# Patient Record
Sex: FEMALE | Race: WHITE | NOT HISPANIC OR LATINO | URBAN - METROPOLITAN AREA
[De-identification: names, ages, dates, MRNs, and addresses within clinical notes are randomized per-mention and may not be internally consistent; named-entity substitution may affect disease eponyms.]

---

## 2019-11-05 ENCOUNTER — EMERGENCY (EMERGENCY)
Facility: HOSPITAL | Age: 24
LOS: 1 days | Discharge: HOME | End: 2019-11-05
Attending: STUDENT IN AN ORGANIZED HEALTH CARE EDUCATION/TRAINING PROGRAM | Admitting: STUDENT IN AN ORGANIZED HEALTH CARE EDUCATION/TRAINING PROGRAM
Payer: MEDICAID

## 2019-11-05 VITALS
DIASTOLIC BLOOD PRESSURE: 102 MMHG | SYSTOLIC BLOOD PRESSURE: 144 MMHG | HEART RATE: 118 BPM | OXYGEN SATURATION: 100 % | TEMPERATURE: 98 F | RESPIRATION RATE: 16 BRPM

## 2019-11-05 VITALS
TEMPERATURE: 98 F | SYSTOLIC BLOOD PRESSURE: 132 MMHG | DIASTOLIC BLOOD PRESSURE: 81 MMHG | HEART RATE: 72 BPM | OXYGEN SATURATION: 100 % | RESPIRATION RATE: 18 BRPM

## 2019-11-05 LAB
ALBUMIN SERPL ELPH-MCNC: 4.7 G/DL — SIGNIFICANT CHANGE UP (ref 3.3–5)
ALP SERPL-CCNC: 59 U/L — SIGNIFICANT CHANGE UP (ref 40–120)
ALT FLD-CCNC: 12 U/L — SIGNIFICANT CHANGE UP (ref 4–33)
ANION GAP SERPL CALC-SCNC: 14 MMO/L — SIGNIFICANT CHANGE UP (ref 7–14)
APPEARANCE UR: CLEAR — SIGNIFICANT CHANGE UP
AST SERPL-CCNC: 17 U/L — SIGNIFICANT CHANGE UP (ref 4–32)
BILIRUB SERPL-MCNC: 0.4 MG/DL — SIGNIFICANT CHANGE UP (ref 0.2–1.2)
BILIRUB UR-MCNC: NEGATIVE — SIGNIFICANT CHANGE UP
BLOOD UR QL VISUAL: NEGATIVE — SIGNIFICANT CHANGE UP
BUN SERPL-MCNC: 13 MG/DL — SIGNIFICANT CHANGE UP (ref 7–23)
CALCIUM SERPL-MCNC: 10.1 MG/DL — SIGNIFICANT CHANGE UP (ref 8.4–10.5)
CHLORIDE SERPL-SCNC: 101 MMOL/L — SIGNIFICANT CHANGE UP (ref 98–107)
CO2 SERPL-SCNC: 23 MMOL/L — SIGNIFICANT CHANGE UP (ref 22–31)
COLOR SPEC: COLORLESS — SIGNIFICANT CHANGE UP
CREAT SERPL-MCNC: 0.98 MG/DL — SIGNIFICANT CHANGE UP (ref 0.5–1.3)
D DIMER BLD IA.RAPID-MCNC: < 150 NG/ML — SIGNIFICANT CHANGE UP
GLUCOSE SERPL-MCNC: 100 MG/DL — HIGH (ref 70–99)
GLUCOSE UR-MCNC: NEGATIVE — SIGNIFICANT CHANGE UP
HCT VFR BLD CALC: 42.1 % — SIGNIFICANT CHANGE UP (ref 34.5–45)
HGB BLD-MCNC: 14.1 G/DL — SIGNIFICANT CHANGE UP (ref 11.5–15.5)
INR BLD: 1 — SIGNIFICANT CHANGE UP (ref 0.88–1.17)
KETONES UR-MCNC: NEGATIVE — SIGNIFICANT CHANGE UP
LEUKOCYTE ESTERASE UR-ACNC: NEGATIVE — SIGNIFICANT CHANGE UP
MCHC RBC-ENTMCNC: 30.5 PG — SIGNIFICANT CHANGE UP (ref 27–34)
MCHC RBC-ENTMCNC: 33.5 % — SIGNIFICANT CHANGE UP (ref 32–36)
MCV RBC AUTO: 91.1 FL — SIGNIFICANT CHANGE UP (ref 80–100)
NITRITE UR-MCNC: NEGATIVE — SIGNIFICANT CHANGE UP
NRBC # FLD: 0 K/UL — SIGNIFICANT CHANGE UP (ref 0–0)
PH UR: 6.5 — SIGNIFICANT CHANGE UP (ref 5–8)
PLATELET # BLD AUTO: 218 K/UL — SIGNIFICANT CHANGE UP (ref 150–400)
PMV BLD: 10.3 FL — SIGNIFICANT CHANGE UP (ref 7–13)
POTASSIUM SERPL-MCNC: 4 MMOL/L — SIGNIFICANT CHANGE UP (ref 3.5–5.3)
POTASSIUM SERPL-SCNC: 4 MMOL/L — SIGNIFICANT CHANGE UP (ref 3.5–5.3)
PROT SERPL-MCNC: 8 G/DL — SIGNIFICANT CHANGE UP (ref 6–8.3)
PROT UR-MCNC: NEGATIVE — SIGNIFICANT CHANGE UP
PROTHROM AB SERPL-ACNC: 11.4 SEC — SIGNIFICANT CHANGE UP (ref 9.8–13.1)
RBC # BLD: 4.62 M/UL — SIGNIFICANT CHANGE UP (ref 3.8–5.2)
RBC # FLD: 11.9 % — SIGNIFICANT CHANGE UP (ref 10.3–14.5)
SODIUM SERPL-SCNC: 138 MMOL/L — SIGNIFICANT CHANGE UP (ref 135–145)
SP GR SPEC: 1 — SIGNIFICANT CHANGE UP (ref 1–1.04)
TSH SERPL-MCNC: 2.24 UIU/ML — SIGNIFICANT CHANGE UP (ref 0.27–4.2)
UROBILINOGEN FLD QL: NORMAL — SIGNIFICANT CHANGE UP
WBC # BLD: 6.89 K/UL — SIGNIFICANT CHANGE UP (ref 3.8–10.5)
WBC # FLD AUTO: 6.89 K/UL — SIGNIFICANT CHANGE UP (ref 3.8–10.5)

## 2019-11-05 PROCEDURE — 99284 EMERGENCY DEPT VISIT MOD MDM: CPT | Mod: 25

## 2019-11-05 PROCEDURE — 71046 X-RAY EXAM CHEST 2 VIEWS: CPT | Mod: 26

## 2019-11-05 PROCEDURE — 93010 ELECTROCARDIOGRAM REPORT: CPT

## 2019-11-05 RX ORDER — SODIUM CHLORIDE 9 MG/ML
1000 INJECTION INTRAMUSCULAR; INTRAVENOUS; SUBCUTANEOUS ONCE
Refills: 0 | Status: COMPLETED | OUTPATIENT
Start: 2019-11-05 | End: 2019-11-05

## 2019-11-05 RX ADMIN — SODIUM CHLORIDE 1000 MILLILITER(S): 9 INJECTION INTRAMUSCULAR; INTRAVENOUS; SUBCUTANEOUS at 13:51

## 2019-11-05 NOTE — ED PROVIDER NOTE - NSFOLLOWUPINSTRUCTIONS_ED_ALL_ED_FT
PLEASE MAKE SURE THAT YOU DRINK PLENTY OF FLUIDS, SCHEDULE AN APPOINTMENT TO SEE OPHTHALMOLOGIST ([phone]), ALSO SEE CARDIOLOGIST IF THE SYMPTOMS PERSISTS; Palpitations  Palpitations are feelings that your heartbeat is irregular or is faster than normal. It may feel like your heart is fluttering or skipping a beat. Palpitations are usually not a serious problem. They may be caused by many things, including smoking, caffeine, alcohol, stress, and certain medicines or drugs. Most causes of palpitations are not serious. However, some palpitations can be a sign of a serious problem. You may need further tests to rule out serious medical problems.  Follow these instructions at home:  Image       Image   Pay attention to any changes in your condition. Take these actions to help manage your symptoms:  Eating and drinking     Avoid foods and drinks that may cause palpitations. These may include:  Caffeinated coffee, tea, soft drinks, diet pills, and energy drinks.Chocolate.Alcohol.Lifestyle     Take steps to reduce your stress and anxiety. Things that can help you relax include:  Yoga.Mind-body activities, such as deep breathing, meditation, or using words and images to create positive thoughts (guided imagery).Physical activity, such as swimming, jogging, or walking. Tell your health care provider if your palpitations increase with activity. If you have chest pain or shortness of breath with activity, do not continue the activity until you are seen by your health care provider.Biofeedback. This is a method that helps you learn to use your mind to control things in your body, such as your heartbeat.Do not use drugs, including cocaine or ecstasy. Do not use marijuana.Get plenty of rest and sleep. Keep a regular bed time.General instructions     Take over-the-counter and prescription medicines only as told by your health care provider.Do not use any products that contain nicotine or tobacco, such as cigarettes and e-cigarettes. If you need help quitting, ask your health care provider.Keep all follow-up visits as told by your health care provider. This is important. These may include visits for further testing if palpitations do not go away or get worse.Contact a health care provider if you:  Continue to have a fast or irregular heartbeat after 24 hours.Notice that your palpitations occur more often.Get help right away if you:  Have chest pain or shortness of breath.Have a severe headache.Feel dizzy or you faint.Summary  Palpitations are feelings that your heartbeat is irregular or is faster than normal. It may feel like your heart is fluttering or skipping a beat.Palpitations may be caused by many things, including smoking, caffeine, alcohol, stress, certain medicines, and drugs.Although most causes of palpitations are not serious, some causes can be a sign of a serious medical problem.Get help right away if you faint or have chest pain, shortness of breath, a severe headache, or dizziness.

## 2019-11-05 NOTE — ED PROVIDER NOTE - OBJECTIVE STATEMENT
Pt is 25 y/o female with no significant PMHx here for eval of palpitations today. Pt states that she was sitting at her new PCP's waiting room to undergo routine physical and establish primary care, started experiencing palpitations which were intense initially and have decreased since. " I got concerned as had to take a deep breath to fully breath". Denies CP, jaw pain, diaphoresis, denies LUE pain/ paresthesias, abd pain, n/v, denies HA, dizziness, denies recent travel/prolonged immobilization, personal/family hx of coagulopathies, non smoker, denies illicit substances use, excessive caffeine or energy drinks use, LMP - last week. no hx of thyroid disease; denies fever or recent infections;

## 2019-11-05 NOTE — ED ADULT NURSE NOTE - OBJECTIVE STATEMENT
Pt A/Ox4 states she was at her PMD today for a routine checkup, while in waiting room pt "felt her heart beating" Denies CP or SOB, denies h/a states her vision has been changing recently and was going to see an optometrist, but hasn't yet. States she ate and drank per baseline today, denies hx of this, states episodes lasted under 5 minutes. Pt appears well at present, breathing even and non labored, skin warm and dry. PIV inserted with aseptic technique, blood drawn, labeled at bedside with 2 pt identifiers and sent to lab. Pt and family aware of POC, NAD. Will continue to monitor.

## 2019-11-05 NOTE — ED ADULT TRIAGE NOTE - CHIEF COMPLAINT QUOTE
A&Ox3 c/o palpitations and sob that began this afternoon, states she feels like when episode comes on she cant catch her breath, denies cp fever chills n/v at this time

## 2019-11-05 NOTE — ED PROVIDER NOTE - PATIENT PORTAL LINK FT
You can access the FollowMyHealth Patient Portal offered by [facility] by registering at the following website: [email or web address]. By joining [employer]’s FollowMyHealth portal, you will also be able to view your health information using other applications (apps) compatible with our system.

## 2019-11-05 NOTE — ED PROVIDER NOTE - ATTENDING CONTRIBUTION TO CARE
25 yo F no pmh/fmh presents for evaluation after palpitation episode. denies drugs/etoh/ caffeine, similar episodes in the past. no recent travel/immobilization or sick contacts.  low suspicion for PE. plan: ivf, labs, ekg reassess.

## 2020-04-03 NOTE — ED PROVIDER NOTE - CLINICAL SUMMARY MEDICAL DECISION MAKING FREE TEXT BOX
Pt passed away on 2020.      Please change her chart to .   Pt is 23 y/o female with no significant PMHx here for eval of palpitations today. Pt states that she was sitting at her new PCP's waiting room to undergo routine physical and establish primary care, started experiencing palpitations which were intense initially and have decreased since. " I got concerned as had to take a deep breath to fully breath". Denies CP, jaw pain, diaphoresis, denies LUE pain/ paresthesias, abd pain, n/v, denies HA, dizziness, denies recent travel/prolonged immobilization, personal/family hx of coagulopathies, non smoker, denies illicit substances use, excessive caffeine or energy drinks use, LMP - last week. no hx of thyroid disease; denies fever or recent infections; on exam - well appearing, afberile, lungs cta, S1s2 appreciated, tachy, abd soft, nt/nd, neuro intact - will get labs including diamer as pt is on OCPs, cxr, ekg, will reasses; Pt is 25 y/o female with no significant PMHx here for eval of palpitations today. Pt states that she was sitting at her new PCP's waiting room to undergo routine physical and establish primary care, started experiencing palpitations which were intense initially and have decreased since. " I got concerned as had to take a deep breath to fully breath". Denies CP, jaw pain, diaphoresis, denies LUE pain/ paresthesias, abd pain, n/v, denies HA, dizziness, denies recent travel/prolonged immobilization, personal/family hx of coagulopathies, non smoker, denies illicit substances use, excessive caffeine or energy drinks use, LMP - last week. no hx of thyroid disease; denies fever or recent infections; on exam - well appearing, afberile, lungs cta, S1s2 appreciated, tachy, abd soft, nt/nd, neuro intact - will get labs including d dimer as pt is on OCPs, cxr, ekg, will reasses;

## 2023-04-10 NOTE — ED PROVIDER NOTE - PR
Chief Complaint  Chest Pain (2wk F/U) and Results (CTA/FFR)    Subjective          Liudmila Ga presents to [id number] for routine follow-up of outpatient testing.  CT angiogram of the coronary arteries on 3/31/2023 revealed coronary calcium score of 164 with dense focal calcification in the proximal LAD with approximately 60% stenosis and 40 to 50% stenosis of the proximal left circumflex coronary artery.  CT FFR of the mid LAD was abnormal and worrisome for significant underlying hemodynamic stenosis.  She has coronary artery calcification, paroxysmal supraventricular tachycardia, hypertension, hyperlipidemia, hypothyroid, arthritis and GERD. She complains of decreased stamina since having Covid-19 for a second time several months ago. She continues to complain of exertional pain between her shoulder blades that resolves with rest. Patient denies shortness of breath, palpitations, dizziness, syncope, orthopnea, PND, or edema.  Patient denies any signs of bleeding.    Hypertension  This is a chronic problem. The current episode started more than 1 year ago. The problem is controlled. Associated symptoms include chest pain and malaise/fatigue. Pertinent negatives include no anxiety, blurred vision, headaches, neck pain, orthopnea, palpitations, peripheral edema, PND, shortness of breath or sweats. Risk factors for coronary artery disease include dyslipidemia. Current antihypertension treatment includes beta blockers and calcium channel blockers. The current treatment provides significant improvement. Identifiable causes of hypertension include a thyroid problem.   Hyperlipidemia  This is a chronic problem. The current episode started more than 1 year ago. Exacerbating diseases include hypothyroidism. Associated symptoms include chest pain. Pertinent negatives include no shortness of breath. Current antihyperlipidemic treatment includes statins. Risk factors for coronary  artery disease include post-menopausal, hypertension and dyslipidemia.     I have reviewed and confirmed the accuracy of the ROS  ALTHEA Merrill      Objective     Current Outpatient Medications:   •  atenolol (TENORMIN) 25 MG tablet, Take 1 tablet by mouth Daily., Disp: , Rfl:   •  Biotin 93209 MCG tablet, Take  by mouth., Disp: , Rfl:   •  bisacodyl (DULCOLAX) 5 MG EC tablet, Take 1 tablet by mouth Daily As Needed for Constipation., Disp: , Rfl:   •  Cholecalciferol (VITAMIN D3) 2000 units tablet, Take  by mouth., Disp: , Rfl:   •  diclofenac (VOLTAREN) 75 MG EC tablet, Take 1 tablet by mouth 2 (Two) Times a Day., Disp: , Rfl:   •  dilTIAZem CD (CARDIZEM CD) 120 MG 24 hr capsule, Take 1 capsule by mouth Daily., Disp: , Rfl:   •  DULoxetine (CYMBALTA) 30 MG capsule, Take 1 capsule by mouth 3 (Three) Times a Week., Disp: , Rfl:   •  fluticasone (FLONASE) 50 MCG/ACT nasal spray, 2 sprays into the nostril(s) as directed by provider Daily., Disp: , Rfl:   •  loratadine (CLARITIN) 10 MG tablet, Take 1 tablet by mouth Daily., Disp: , Rfl:   •  Lysine 500 MG capsule, Take 500 mg by mouth., Disp: , Rfl:   •  metoclopramide (REGLAN) 5 MG tablet, Take 1 tablet by mouth 2 (Two) Times a Day., Disp: , Rfl:   •  Omega-3 Fatty Acids (FISH OIL) 1200 MG capsule capsule, Take 1 capsule by mouth Daily., Disp: , Rfl:   •  pantoprazole (PROTONIX) 40 MG EC tablet, Take 1 tablet by mouth Daily., Disp: , Rfl:   •  potassium citrate (UROCIT-K) 10 MEQ (1080 MG) CR tablet, 1 tablet 2 (Two) Times a Day., Disp: , Rfl:   •  rosuvastatin (CRESTOR) 5 MG tablet, TAKE 1 TABLET EVERY DAY, Disp: 90 tablet, Rfl: 3  •  Synthroid 50 MCG tablet, , Disp: , Rfl:   •  vitamin C (ASCORBIC ACID) 500 MG tablet, Take 2 tablets by mouth Daily., Disp: , Rfl:   •  Zinc 50 MG capsule, Take  by mouth., Disp: , Rfl:   •  zolpidem (AMBIEN) 10 MG tablet, Take 1 tablet by mouth Every Night., Disp: , Rfl:   Vital Signs:   /62   Pulse 68   Ht 162.6 cm  "(64\")   Wt 78.5 kg (173 lb)   SpO2 97%   BMI 29.70 kg/m²     Vitals and nursing note reviewed.   Constitutional:       General: Not in acute distress.     Appearance: Normal and healthy appearance. Well-developed, overweight and not in distress. Not diaphoretic.   Eyes:      General: Lids are normal.         Right eye: No discharge.         Left eye: No discharge.      Conjunctiva/sclera: Conjunctivae normal.      Pupils: Pupils are equal, round, and reactive to light.   HENT:      Head: Normocephalic and atraumatic.      Jaw: There is normal jaw occlusion.      Right Ear: External ear normal.      Left Ear: External ear normal.      Nose: Nose normal.   Neck:      Thyroid: No thyromegaly.      Vascular: No carotid bruit, JVD or JVR. JVD normal.      Trachea: Trachea normal. No tracheal deviation.   Pulmonary:      Effort: Pulmonary effort is normal. No respiratory distress.      Breath sounds: Normal breath sounds. No decreased breath sounds. No wheezing. No rhonchi. No rales.   Chest:      Chest wall: Not tender to palpatation.   Cardiovascular:      PMI at left midclavicular line. Bradycardia present. Regular rhythm. Normal S1. Normal S2.      Murmurs: There is no murmur.      No gallop. No click. No rub.   Pulses:     Intact distal pulses. No decreased pulses.   Edema:     Peripheral edema absent.   Abdominal:      General: Bowel sounds are normal. There is no distension.      Palpations: Abdomen is soft.      Tenderness: There is no abdominal tenderness.   Musculoskeletal: Normal range of motion.         General: No tenderness or deformity.      Cervical back: Normal range of motion and neck supple. Skin:     General: Skin is warm and dry.      Coloration: Skin is not pale.      Findings: No erythema or rash.   Neurological:      General: No focal deficit present.      Mental Status: Alert, oriented to person, place, and time and oriented to person, place and time.   Psychiatric:         Attention and " Perception: Attention and perception normal.         Mood and Affect: Mood and affect normal.         Speech: Speech normal.         Behavior: Behavior normal.         Thought Content: Thought content normal.         Cognition and Memory: Cognition and memory normal.         Judgment: Judgment normal.        Result Review :   The following data was reviewed by: ALTHEA Merrill on 4/11/2023:  Common labs        3/31/2023    13:44   Common Labs   Creatinine 1.11       Data reviewed: Cardiology studies dobutamine stress echo 5/18/22, 14 day holter monitor 5/30/22, CT angiogram of the coronary arteries 3/31/2023 and 2d echo 6/24/22          Assessment and Plan    Diagnoses and all orders for this visit:    1. Coronary artery calcification (Primary)- coronary calcium score of 175.8 on CT cardiac calcium score 4/25/2022.  Negative dobutamine stress echo 5/18/2022.  Abnormal CT angiogram of the coronary arteries 3/31/2023 with abnormal CT FFR of the mid LAD.  Patient to be scheduled for left heart catheterization. Start aspirin 81 mg daily.     2. Primary hypertension-blood pressure is well controlled.  Continue atenolol and diltiazem.  Monitor and record daily blood pressure. Report readings consistently higher than 130/90 or consistently lower than 100/60.     3. Mixed hyperlipidemia-management per PCP.  Continue rosuvastatin.    4. PSVT (paroxysmal supraventricular tachycardia) (HCC)- single 5 beat run on recent 14 day holter monitor. Stable. Continue atenolol.     5. Chest pain, atypical-patient to be scheduled for left heart catheterization.    Follow Up   Return in about 4 weeks (around 5/9/2023) for Next scheduled follow up.  Patient was given instructions and counseling regarding her condition or for health maintenance advice. Please see specific information pulled into the AVS if appropriate.        154